# Patient Record
Sex: FEMALE | Race: WHITE | Employment: STUDENT | URBAN - METROPOLITAN AREA
[De-identification: names, ages, dates, MRNs, and addresses within clinical notes are randomized per-mention and may not be internally consistent; named-entity substitution may affect disease eponyms.]

---

## 2018-04-19 ENCOUNTER — HOSPITAL ENCOUNTER (OUTPATIENT)
Dept: PHYSICAL THERAPY | Age: 23
Discharge: HOME | End: 2018-04-19
Payer: COMMERCIAL

## 2018-04-19 PROCEDURE — 97165 OT EVAL LOW COMPLEX 30 MIN: CPT

## 2018-04-19 NOTE — PROGRESS NOTES
OCCUPATIONAL THERAPY - DAILY TREATMENT NOTE    Patient Name: Helene Padron        Date: 2018  : 1995   YES Patient  Verified  Visit #:     Insurance: Payor: Gege Alvarez / Plan: [address] PT / Product Type: Commerical /      In time: 4:10 Out time: 5:05   Total Treatment Time: 55     TREATMENT AREA =  Both hands    SUBJECTIVE    Pain Level (on 0 to 10 scale):   10   Medication Changes/New allergies or changes in medical history, any new surgeries or procedures? NO    If yes, update Summary List   Subjective Functional Status/Changes:  []  No changes reported     This really started when I was working in the [employer] a lot. OBJECTIVE     min Therapeutic Exercise:  [x]  See flow sheet        min Patient Education:  YES  Reviewed HEP- CTS info; splinting;brachial stretch; flushing massage    []  Progressed/Changed HEP based on: Other Objective/Functional Measures:    See initial eval for details     Post Treatment Pain Level (on 0 to 10) scale:    10     ASSESSMENT  Assessment/Changes in Function:     Patient presents with pain, prn tingling in hands and should benefit from skilled OT to address deficits. 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    Progress toward goals / Updated goals:    See eval goals      PLAN  [x]  Upgrade activities as tolerated YES Continue plan of care   []  Discharge due to :    [x]  Other: OT 2x week x 4 weeks for goals once she graduates and returns from college next month     Therapist: NELSON Ybarra    Date: 2018 Time: 6:13 PM

## 2018-04-19 NOTE — PROGRESS NOTES
[address] [facility] PHYSICAL THERAPY  [address], [address] - Phone: [phone]  Fax: [phone] / [address] [facility]  Patient Name: Katie Pickens : 1995   Medical   Diagnosis: Bilateral wrist pain [M25.531, M25.532]  Bilateral carpal tunnel syndrome [G56.03] Treatment Diagnosis: yinka CTS   Onset Date: Winter 2016     Referral Source: Henrietta Jensen, * Start of Care [facility]): 2018   Prior Hospitalization: See medical history Provider #: [id number]   Prior Level of Function: Ind   Comorbidities: na   Medications: Verified on Patient Summary List   The Plan of Care and following information is based on the information from the initial evaluation.   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 She also reports numbness prn in her small fingers which may be a result in ulnar nerve compression from weight bearing on her elbows. AROM and strength both arms is WFL. Right  75# left 62#. Right pinch 10-18# left 7-10#. Sensation intact to light touch with reports of tingling prn from her palm, SF, thumb and forearm. Pain 4/10 in hands. She is Ind with ADLs but with pain.  ===========================================================================================  Eval Complexity: History: MEDIUM Complexity : Expanded review of history including physical, cognitive and psychosocial  history ; Examination: LOW Complexity : 1-3 performance deficits relating to physical, cognitive , or psychosocial skils that result in activity limitations and / or participation restrictions ;  Decision Making:LOW Complexity : No comorbidities that affect functional and no verbal or physical assistance needed to complete eval tasks   Problem List: Pain effecting function and Decreased ADL/functional abilities    Treatment Plan may include any combination of the following: Therapeutic exercise, Physical agent/modality, Manual therapy, Splinting/orthoses, Patient education, ADLs/IADLs and Other- iontophoresis   Patient / Family readiness to learn indicated by: asking questions, trying to perform skills and interest  Persons(s) to be included in education: patient (P)  Barriers to Learning/Limitations: None  Measures taken:    Patient Goal (s): Pain relief. Patient self reported health status: excellent  Rehabilitation Potential: good   Short Term Goals: To be accomplished in 4 weeks:                              She is returning to college this weekend and will come back to therapy in May after graduation. 1.Ind HEP  2.Good awareness CTS to avoid further injury  3. Decrease pain 1/10 with ADLs  4. Ind splint use with ADLs     Long Term Goals: To be accomplished in 5-7 weeks: 1. Return to art pain free  Frequency / Duration:   Patient to be seen 1-2  times per week for 5-7  weeks:  Patient / Caregiver education and instruction: activity modification, brace/ splint application and exercises  G-Codes (GO): na  Therapist Signature: NELSON Flowers Date: 0/97/3625   Certification Period: na Time: 6:14 PM   ===========================================================================================  I certify that the above Occupational Therapy Services are being furnished while the patient is under my care. I agree with the treatment plan and certify that this therapy is necessary. Physician Signature:        Date:       Time:     Please sign and return to [facility] or you may fax the signed copy to [phone]. Thank you.

## 2018-05-15 ENCOUNTER — APPOINTMENT (OUTPATIENT)
Dept: PHYSICAL THERAPY | Age: 23
End: 2018-05-15

## 2018-06-05 NOTE — PROGRESS NOTES
[address] [facility] PHYSICAL THERAPY  [address] [facility], [address] - Phone: [phone]  Fax: [phone] SUMMARY  Patient Name: Chris Morris : 1995   Treatment/Medical Diagnosis: Bilateral wrist pain [M25.531, M25.532]  Bilateral carpal tunnel syndrome [G56.03]   Referral Source: Davey Huddleston, *     Date of Initial Visit: 18 Attended Visits: 1 Missed Visits: 3     SUMMARY OF TREATMENT  Patient was seen for evaluation only and did not return for treatment. CURRENT STATUS  She cancelled one appt and was a no show for 2 appts. Goals unmet. G-Codes: na  RECOMMENDATIONS  Discontinue therapy due to lack of attendance or compliance. If you have any questions/comments please contact us directly at [phone]. Thank you for allowing us to assist in the care of your patient.     Therapist Signature: JIA Gomez/L Date: 18     Time: 5:51 PM